# Patient Record
(demographics unavailable — no encounter records)

---

## 2024-10-23 NOTE — PROCEDURE
[Transvaginal Ultrasound] : transvaginal ultrasound [Saline Infusion Sonography] : saline infusion sonography [F/U Abnormal US] : f/u abnormal ultrasound [FreeTextEntry3] : .6 cm polyp [FreeTextEntry4] : endometrial polyp

## 2024-10-23 NOTE — PLAN
[FreeTextEntry1] : 49 [place] [facility] [place]

## 2024-10-23 NOTE — END OF VISIT
[FreeTextEntry3] : I, Franchesca Osmel, acted as a scribe on behalf of Dr. Chelsey Hendricks D.O. on 10/23/2024.  All medical entries made by the scribe were at my, Dr. Chelsey Hendricks D.O, direction and personally dictated by me on 10/23/2024. I have reviewed the chart and agree that the record accurately reflects my personal performance of the history, physical exam, assessment and plan. I have also personally directed, reviewed, and agreed with the chart.

## 2025-03-10 NOTE — CARDIOLOGY SUMMARY
[de-identified] : 3/10/25 NSR 80, NSST [de-identified] : 4/28/2021: no evidence of ischemia  [de-identified] : 4/28/2021: normal LV systolic function\par  \par  7/18/2018, 1. Normal aortic root, aortic arch and descending thoracicaorta.2. Normal left atrium. LA volume index = 16 cc/m2. No leftatrial or left atrial appendage thrombus.3. Normal left ventricular systolic function. No segmentalwall motion abnormalities.4. Normal right ventricular size and function.5. Contrast injection demonstrates no evidence of a patentforamen ovale.

## 2025-03-10 NOTE — HISTORY OF PRESENT ILLNESS
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 Had unsuccessful cardioversion x3 in ED, received Cardizem 360 daily, metoprolol 25 mg BID, flecainide 300 mg x1 PO and eventually self-converted to SR. Continuing Eliquis BID and Cardizem daily. Has flecainide to use as needed but has had no recent afib, last episode 2/2023. No s/s of bleeding with Eliquis. Since last visit, she was diagnosed with CHRISTIAN and will be fitted for CPAP and states she will start Zepbound for weight loss if approved. Denies chest pain, palpitations, SOB, syncope or near syncope.

## 2025-03-10 NOTE — DISCUSSION/SUMMARY
[EKG obtained to assist in diagnosis and management of assessed problem(s)] : EKG obtained to assist in diagnosis and management of assessed problem(s) [FreeTextEntry1] : Impression:  1. Paroxysmal afib w/ RVR: s/p unsuccessful DCCV on 12/2022. EKG performed today to assess for presence of recurrent afib and reveals NSR. Has flecainide to use as needed. Resume diltiazem 360 daily. Review of recent ECHO with normal LVEF and stress test without ischemia or tachyarrhythmias. Consider ablation in future. Encouraged weight loss strategies prior to ablation pursued. If continues to have recurrent afib despite flecainide, will strongly consider ablation in future. Will start treatment for CHRISTIAN with CPAP.   2. Hypothyroidism: resume Synthroid as prescribed. Continue regular f/u with PCP for routine TFT monitoring and management. Will start Zepbound for weight loss.   3. HTN: improved with use of chlorthalidone; resume oral antihypertensives as prescribed. Encouraged heart healthy diet, sodium restriction, and weight loss. Continue regular f/u with Cardiologist for further HTN management.  Continue f/u with Cardiologist and RTO for f/u in 6 months.

## 2025-05-30 NOTE — HISTORY OF PRESENT ILLNESS
[Pathology reviewed] : pathology reviewed [None] : no vaginal bleeding [de-identified] : 5/16 [de-identified] : B&C

## 2025-05-30 NOTE — PLAN
[FreeTextEntry1] : 49 year old female presents for post op follow up. Pt s/p d&c on 5/16 with no complaints.  -pathology benign polyp -f/u with annual

## 2025-05-30 NOTE — END OF VISIT
[FreeTextEntry3] : I, Tiffani Garrison, acted as a scribe on behalf of Dr. Chelsey Hendricks D.O. on 05/30/2025.   All medical entries made by the scribe were at my Dr. Chelsey Hendricks D.O. direction and personally dictated by me on 05/30/2025. I have reviewed the chart and agree that the record accurately reflects my personal performance of the history, physical exam, assessment and plan. I have also personally directed, reviewed, and agreed with the chart.

## 2025-06-20 NOTE — END OF VISIT
[FreeTextEntry3] : I, Zabrina Martin, acted as a scribe on behalf of Dr. Chelsey Hendricks D.O, on 06/20/2025.   All medical entries made by the scribe were at my, Dr. Chelsey Hendricks D.O, direction and personally dictated by me on 06/20/2025. I have reviewed the chart and agree that the record accurately reflects my personal performance of the history, physical exam, assessment and plan. I have also personally directed, reviewed, and agreed with the chart.

## 2025-06-20 NOTE — HISTORY OF PRESENT ILLNESS
[FreeTextEntry1] : 49 year old presents for annual exam.   OBHx:  x2 (, ) PMHx: thyroid, AFIB PSHx: parathyroid removed, gallbladder removed, D&C hysteroscopic polypectomy (2025) Allergies: Sulfa [Mammogramdate] : 06/28/2024 [BreastSonogramDate] : 06/28/2024 [PapSmeardate] : 05/28/2024

## 2025-06-20 NOTE — PLAN
[FreeTextEntry1] : 49 year old for annual exam  -Pap done today -Rx mammo/sono given  RTO 1 year for annual exam  Quality 431: Preventive Care And Screening: Unhealthy Alcohol Use - Screening: Patient not identified as an unhealthy alcohol user when screened for unhealthy alcohol use using a systematic screening method Quality 226: Preventive Care And Screening: Tobacco Use: Screening And Cessation Intervention: Patient screened for tobacco use and is an ex/non-smoker Detail Level: Detailed Quality 130: Documentation Of Current Medications In The Medical Record: Current Medications Documented

## 2025-06-23 NOTE — DISCUSSION/SUMMARY
[FreeTextEntry1] : Impression:  1. Paroxysmal afib w/ RVR: Recent Holter with noted afib up to 10 hours in duration. EKG performed today to assess for presence of recurrent afib and reveals NSR. Has flecainide to use as needed. Resume diltiazem 360 daily. Review of recent ECHO with normal LVEF and stress test without ischemia or tachyarrhythmias. Consider ablation in future. Encouraged weight loss strategies prior to ablation pursued. If continues to have recurrent afib despite flecainide, will strongly consider ablation in future. Will start treatment for CHRISTIAN with CPAP.  Holter worn from 5/23-6/3 with min HR 40bpm, max HR 231bpm, avg HR 67bpm. Predominant underlying rhythm was SR. AF occurred with 4% burden, longest duration 10hr with HR range from 53-153bpm.  2. CHRISTIAN: resume compliance with CHRISTIAN management to prevent future sinus node dysfunction and atrial fibrillation. Encouraged weight loss.  3. HTN: improved with use of chlorthalidone; resume oral antihypertensives as prescribed. Encouraged heart healthy diet, sodium restriction, and weight loss. Continue regular f/u with Cardiologist for further HTN management.  Continue f/u with Cardiologist and RTO for f/u in 6 months.  [EKG obtained to assist in diagnosis and management of assessed problem(s)] : EKG obtained to assist in diagnosis and management of assessed problem(s)

## 2025-06-23 NOTE — CARDIOLOGY SUMMARY
[de-identified] : 3/10/25 NSR 80, NSST [de-identified] : 4/28/2021: no evidence of ischemia  [de-identified] : 4/28/2021: normal LV systolic function\par  \par  7/18/2018, 1. Normal aortic root, aortic arch and descending thoracicaorta.2. Normal left atrium. LA volume index = 16 cc/m2. No leftatrial or left atrial appendage thrombus.3. Normal left ventricular systolic function. No segmentalwall motion abnormalities.4. Normal right ventricular size and function.5. Contrast injection demonstrates no evidence of a patentforamen ovale.

## 2025-06-23 NOTE — HISTORY OF PRESENT ILLNESS
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 Since last visit, started using CPAP. Also started Zepbound for weight loss and now lost about 20lbs. Underwent recent Holter worn from 5/23-6/3 with min HR 40bpm, max HR 231bpm, avg HR 67bpm. Predominant underlying rhythm was SR. AF occurred with 4% burden, longest duration 10hr with HR range from 53-153bpm. Was symptomatic of afib episode and took 300mg flecainide at that time. Remains on diltiazem and Eliquis. When in afib, feels palpitations. Denies chest pain, SOB, syncope or near syncope.